# Patient Record
Sex: MALE | Race: WHITE | NOT HISPANIC OR LATINO | URBAN - NONMETROPOLITAN AREA
[De-identification: names, ages, dates, MRNs, and addresses within clinical notes are randomized per-mention and may not be internally consistent; named-entity substitution may affect disease eponyms.]

---

## 2018-09-12 PROBLEM — H52.4: Noted: 2019-03-22

## 2018-09-12 PROBLEM — H35.3131: Noted: 2019-03-22

## 2018-09-12 PROBLEM — H25.813: Noted: 2018-09-12

## 2019-03-22 ENCOUNTER — IMPORTED ENCOUNTER (OUTPATIENT)
Dept: URBAN - NONMETROPOLITAN AREA CLINIC 1 | Facility: CLINIC | Age: 74
End: 2019-03-22

## 2019-03-22 PROCEDURE — 99214 OFFICE O/P EST MOD 30 MIN: CPT

## 2019-03-22 NOTE — PATIENT DISCUSSION
Cataract(s)-Visually significant cataract OU.-Cataract(s) causing symptomatic impairment of visual function not correctable with a tolerable change in glasses or contact lenses lighting or non-operative means resulting in specific activity limitations and/or participation restrictions including but not limited to reading viewing television driving or meeting vocational or recreational needs. -Expectation is clearer vision and functional improvement in symptoms as well as reduced glare disability after cataract removal.-Order IOLMaster and OPD today. -Recommend Toric IOL OD/Limbal Relaxing Incisions OS based on today's OPD testing and lifestyle questionnaire.-All questions were answered regarding surgery including pre and post-op medications appointments activity restrictions and anesthetic usage.-The risks benefits and alternatives and special risk factors for the patient were discussed in detail including but not limited to: bleeding infection retinal detachment vitreous loss problems with the implant and possible need for additional surgery.-Although rare the possibility of complete vision loss was discussed.-The possible need for glasses post-operatively was discussed.-Order H&P.-Patient elects to proceed with cataract surgery OD. Will schedule at patient's convenience and re-evaluate OS in the future. -Dilates poorly recommend Trad Sx vs LenSx. AMD - dry-Explained dry AMD and advised that there are no treatments available at this time. -Recommend AREDS 2 MVT. -Continue Amsler grid monitoring daily. Pt is to contact our office if any changes are noted.

## 2019-04-04 NOTE — PATIENT DISCUSSION
MALISSA OU:  PRESCRIBED UV PROTECTION TO SLOW GROWTH. PRESCRIBE ARTIFICAL TEARS TO INCREASE COMFORT.

## 2019-04-09 PROBLEM — H25.813: Noted: 2019-04-09

## 2019-04-09 PROBLEM — H52.4: Noted: 2019-04-09

## 2019-04-09 PROBLEM — H35.3131: Noted: 2019-04-09

## 2019-04-16 ENCOUNTER — IMPORTED ENCOUNTER (OUTPATIENT)
Dept: URBAN - NONMETROPOLITAN AREA CLINIC 1 | Facility: CLINIC | Age: 74
End: 2019-04-16

## 2019-04-16 PROBLEM — Z01.818: Noted: 2019-04-16

## 2019-04-16 PROBLEM — E78.5: Noted: 2019-04-16

## 2019-04-16 PROBLEM — H25.813: Noted: 2019-04-16

## 2019-04-16 PROBLEM — I10: Noted: 2019-04-16

## 2019-04-16 PROCEDURE — 99214 OFFICE O/P EST MOD 30 MIN: CPT

## 2019-04-25 ENCOUNTER — IMPORTED ENCOUNTER (OUTPATIENT)
Dept: URBAN - NONMETROPOLITAN AREA CLINIC 1 | Facility: CLINIC | Age: 74
End: 2019-04-25

## 2019-04-25 PROBLEM — Z98.41: Noted: 2019-04-25

## 2019-04-25 PROBLEM — E78.5: Noted: 2019-04-25

## 2019-04-25 PROBLEM — Z01.818: Noted: 2019-04-25

## 2019-04-25 PROBLEM — H25.813: Noted: 2019-04-25

## 2019-04-25 PROBLEM — I10: Noted: 2019-04-25

## 2019-04-25 PROCEDURE — 92136 OPHTHALMIC BIOMETRY: CPT

## 2019-04-25 PROCEDURE — 66984 XCAPSL CTRC RMVL W/O ECP: CPT

## 2019-04-25 PROCEDURE — V2787 ASTIGMATISM-CORRECT FUNCTION: HCPCS

## 2019-04-25 NOTE — PATIENT DISCUSSION
s/p PCIOL Toric/Trad IOL OD 4/25/19 JS-Pt doing well s/p PCIOL. -Continue post-op gtts according to instruction sheet and sleep with eye shield over eye for 7 nights.-Avoid bending at the waist lifting anything over 5lbs and dirty or stefano environments. -RTC 1 wk r [place].

## 2019-05-02 ENCOUNTER — IMPORTED ENCOUNTER (OUTPATIENT)
Dept: URBAN - NONMETROPOLITAN AREA CLINIC 1 | Facility: CLINIC | Age: 74
End: 2019-05-02

## 2019-05-02 PROBLEM — Z98.42: Noted: 2019-05-16

## 2019-05-02 PROBLEM — Z98.41: Noted: 2019-05-02

## 2019-05-02 PROBLEM — H25.812: Noted: 2019-05-02

## 2019-05-02 PROCEDURE — 99024 POSTOP FOLLOW-UP VISIT: CPT

## 2019-05-02 NOTE — PATIENT DISCUSSION
Cataract(s)-Visually significant cataract OS. -Cataract(s) causing symptomatic impairment of visual function not correctable with a tolerable change in glasses or contact lenses lighting or non-operative means resulting in specific activity limitations and/or participation restrictions including but not limited to reading viewing television driving or meeting vocational or recreational needs. -Expectation is clearer vision and functional improvement in symptoms as well as reduced glare disability after cataract removal.-Recommend Toric IOL /Trad based on previous OPD testing and lifestyle questionnaire.-All questions were answered regarding surgery including pre and post-op medications appointments activity restrictions and anesthetic usage.-The risks benefits and alternatives and special risk factors for the patient were discussed in detail including but not limited to: bleeding infection retinal detachment vitreous loss problems with the implant and possible need for additional surgery.-Although rare the possibility of complete vision loss was discussed.-The need for glasses post-operatively was discussed.-Patient elects to proceed with cataract surgery OS. Will schedule at patient's convenience. s/p PCIOL OD Toric/Trad -Pt doing well at 1 week s/p PCIOL. -Continue post-op gtts according to instruction sheet.-Okay to resume usual activites and d/c eye shield.

## 2019-05-09 ENCOUNTER — IMPORTED ENCOUNTER (OUTPATIENT)
Dept: URBAN - NONMETROPOLITAN AREA CLINIC 1 | Facility: CLINIC | Age: 74
End: 2019-05-09

## 2019-05-09 PROCEDURE — 66984 XCAPSL CTRC RMVL W/O ECP: CPT

## 2019-05-09 PROCEDURE — 92136 OPHTHALMIC BIOMETRY: CPT

## 2019-05-09 PROCEDURE — 66999 UNLISTED PX ANT SEGMENT EYE: CPT

## 2019-05-09 NOTE — PATIENT DISCUSSION
s/p PCIOL-Pt doing well s/p PCIOL. -Continue post-op gtts according to instruction sheet and sleep with eye shield over eye for 7 nights.-Avoid bending at the waist lifting anything over 5lbs and dirty or stefano environments. -RTC 1 week POV.

## 2019-05-16 ENCOUNTER — IMPORTED ENCOUNTER (OUTPATIENT)
Dept: URBAN - NONMETROPOLITAN AREA CLINIC 1 | Facility: CLINIC | Age: 74
End: 2019-05-16

## 2019-07-01 ENCOUNTER — IMPORTED ENCOUNTER (OUTPATIENT)
Dept: URBAN - NONMETROPOLITAN AREA CLINIC 1 | Facility: CLINIC | Age: 74
End: 2019-07-01

## 2019-09-30 ENCOUNTER — IMPORTED ENCOUNTER (OUTPATIENT)
Dept: URBAN - NONMETROPOLITAN AREA CLINIC 1 | Facility: CLINIC | Age: 74
End: 2019-09-30

## 2019-09-30 PROCEDURE — 99213 OFFICE O/P EST LOW 20 MIN: CPT

## 2020-07-02 NOTE — PATIENT DISCUSSION
CATARACTS, OU: PMH DISCUSSED LIMITED VISUAL POTENTIAL DUE TO OPTIC NERVE ISSUES. VISUALLY SIGNIFICANT. OPTION OF SURGERY VERSUS FOLLOWING VERSUS UPDATING GLASSES DISCUSSED. RBA'S DISCUSSED, PATIENT UNDERSTANDS AND DESIRES SURGERY TO INCREASE VISION FOR DRIVING AND READING. SCHEDULE CATARACT SURGERY/PRE-OP OU AFTER CLEARANCE FROM DR Glory Caputo.

## 2020-07-02 NOTE — PATIENT DISCUSSION
SCHEMIC OPTIC NEUROPATHY OS&gt;OD: REFER TO DR Hang Camacho FOR EVALUATION AND CATARACT CLEARANCE. PT HAS SEEN DR. KERRISON IN THE PAST BUT COULD NOT RETURN TO HIM DUE TO INSURANCE.

## 2020-07-09 NOTE — PATIENT DISCUSSION
NO CONTRAINDICATION FOR CATARACT SURGERY. UNSURE OF VISUAL POTENTIAL DUE TO PREVIOUS AION OU. OK FOR CAT SX OU.

## 2021-02-04 NOTE — PATIENT DISCUSSION
OK FOR CATARACT SURGERY OU. DISCUSSED LIMITED POTENTIAL OF IMPROVING VISION DUE TO OPTIC NERVE ISSUES.

## 2021-03-22 NOTE — PATIENT DISCUSSION
New Prescription: prednisolone acetate (prednisolone acetate): drops,suspension: 1% 1 drop four times a day into affected eye 03-

## 2021-03-22 NOTE — PATIENT DISCUSSION
CATARACT OD: RBA'S DISCUSSED, PMH DISCUSSED HE WILL STILL HAVE LIMITED VISION SECONDARY TO PROBLEMS WITH THE OPTIC NERVES. PATIENT UNDERSTANDS AND DESIRES TO PROCEED WITH SURGERY. CONSENT READ AND SIGNED.   PATIENT DESIRES TORIC SET FOR Felicita croft

## 2021-03-22 NOTE — PATIENT DISCUSSION
CATARACTS, OU: VISUALLY SIGNIFICANT. OPTION OF SURGERY VERSUS FOLLOWING VERSUS UPDATING GLASSES DISCUSSED. RBA'S DISCUSSED, PATIENT UNDERSTANDS AND DESIRES SURGERY TO INCREASE VISION FOR DRIVING AND GLARE FROM LIGHTS. SCHEDULE CATARACT SURGERY/PRE-OP OU TODAY.

## 2022-04-10 ASSESSMENT — TONOMETRY
OD_IOP_MMHG: 15
OD_IOP_MMHG: 14
OD_IOP_MMHG: 14
OD_IOP_MMHG: 15
OS_IOP_MMHG: 15
OS_IOP_MMHG: 14
OS_IOP_MMHG: 14
OS_IOP_MMHG: 20
OD_IOP_MMHG: 19
OS_IOP_MMHG: 11
OD_IOP_MMHG: 19
OS_IOP_MMHG: 32

## 2022-04-10 ASSESSMENT — VISUAL ACUITY
OD_CC: 20/60-2
OS_CC: 20/20
OS_CC: 20/20-2
OD_GLARE: 20/400
OD_CC: 20/20
OS_PH: 20/30
OS_AM: 20/20
OD_CC: 20/20-1
OS_CC: 20/30+2
OS_GLARE: 20/400
OD_PAM: 20/20
OD_SC: 20/30
OS_CC: 20/80
OS_SC: 20/30
OS_CC: 20/40-1
OS_AM: 20/20
OD_CC: 20/20
OD_CC: 20/25
OU_CC: J1+
OS_GLARE: 20/400
OD_CC: 20/20-1
OS_CC: 20/40
OS_CC: 20/20

## 2022-06-23 NOTE — PATIENT DISCUSSION
Recommended formal refraction with general ophthalmologist before consideration of cataract surgery.

## 2022-06-30 NOTE — PATIENT DISCUSSION
Refraction today slightly improves VA. Pt advised cataract surgery would not completely fix VA concerns (d/t diseased optic nerve). Okay to consider when cleared by Dr. Floridalma Hilton. Pt advised he would likely still need glasses after surgery unless he paid for a specialty lens. Will hold off at this time.
